# Patient Record
Sex: FEMALE | Race: OTHER | Employment: FULL TIME | URBAN - METROPOLITAN AREA
[De-identification: names, ages, dates, MRNs, and addresses within clinical notes are randomized per-mention and may not be internally consistent; named-entity substitution may affect disease eponyms.]

---

## 2018-11-27 ENCOUNTER — HOSPITAL ENCOUNTER (EMERGENCY)
Facility: HOSPITAL | Age: 48
Discharge: STILL ADMITTED OR ADMINISTRATIVE | End: 2018-11-27
Attending: EMERGENCY MEDICINE

## 2025-02-07 ENCOUNTER — TELEPHONE (OUTPATIENT)
Dept: ORTHOPEDICS CLINIC | Facility: CLINIC | Age: 55
End: 2025-02-07

## 2025-02-07 DIAGNOSIS — M25.531 RIGHT WRIST PAIN: Primary | ICD-10-CM

## 2025-02-07 NOTE — TELEPHONE ENCOUNTER
Patient have a fractured right wrist. Please advise on available appointment time.

## 2025-02-07 NOTE — TELEPHONE ENCOUNTER
X-Ray ordered and scheduled. Son/patient notified.    DOI: 2/6/25  Fall  Right wrist  ER as [facility] in [place]  - triquetral bone   Non displaced fracture along the radial styloid - re x ray in 7 to 10 days to assess for interval healing  Future Appointments   Date Time Provider Department Center   2/12/2025 11:40 AM Mitzy Owens PA [facility] [place]   6/4/2025 10:15 AM [facility] sent to the patient.

## 2025-02-07 NOTE — TELEPHONE ENCOUNTER
Where was she seen for fracture?  Does she have imaging?  Can she read from the report what it says about the fracture?

## 2025-02-10 NOTE — TELEPHONE ENCOUNTER
Mitzy Owens PA to [facility]       2/10/25  8:52 AM   Fine to stay on with new xr out of splint

## 2025-02-10 NOTE — TELEPHONE ENCOUNTER
Attempted to call patient, no VM no mychart set up.      Future Appointments   Date Time Provider Department Center   2/12/2025 11:15 AM [facility] XR IC [facility]1 [facility] [place]   2/12/2025 11:40 AM Mtizy Owens PA [facility] [place]

## 2025-02-12 ENCOUNTER — HOSPITAL ENCOUNTER (OUTPATIENT)
Dept: GENERAL RADIOLOGY | Age: 55
Discharge: HOME | End: 2025-02-12
Attending: STUDENT IN AN ORGANIZED HEALTH CARE EDUCATION/TRAINING PROGRAM
Payer: MEDICAID

## 2025-02-12 ENCOUNTER — OFFICE VISIT (OUTPATIENT)
Dept: ORTHOPEDICS CLINIC | Facility: CLINIC | Age: 55
End: 2025-02-12
Payer: MEDICAID

## 2025-02-12 VITALS — HEIGHT: 65 IN | BODY MASS INDEX: 26.33 KG/M2 | WEIGHT: 158 LBS

## 2025-02-12 DIAGNOSIS — S62.111A CLOSED CHIP FRACTURE OF TRIQUETRUM OF RIGHT WRIST, INITIAL ENCOUNTER: Primary | ICD-10-CM

## 2025-02-12 DIAGNOSIS — M25.531 RIGHT WRIST PAIN: ICD-10-CM

## 2025-02-12 PROCEDURE — 99203 OFFICE O/P NEW LOW 30 MIN: CPT | Performed by: PHYSICIAN ASSISTANT

## 2025-02-12 PROCEDURE — 73110 X-RAY EXAM OF WRIST: CPT | Performed by: STUDENT IN AN ORGANIZED HEALTH CARE EDUCATION/TRAINING PROGRAM

## 2025-02-12 RX ORDER — IBUPROFEN 800 MG/1
TABLET, FILM COATED ORAL
COMMUNITY

## 2025-02-12 RX ORDER — HYDROCODONE BITARTRATE AND ACETAMINOPHEN 5; 325 MG/1; MG/1
1 TABLET ORAL
COMMUNITY
Start: 2025-02-08

## 2025-02-12 NOTE — H&P
Clinic Note [facility]     Assessment/Plan:  54 year old female    Right wrist triquetral avulsion fracture, nondisplaced-discussed treating this non operatively and transitioning the patient to a removable wrist brace.  I advised the patient of no heavy lifting or weightbearing.  Patient can continue using the fingers for light activities.      ICD-10-CM    1. Closed chip fracture of triquetrum of right wrist, initial encounter  S62.111A DME - EXTERNAL              Follow Up: 3 to 4 weeks, x-rays before    Diagnostic Studies:  X-rays left wrist 3 views: These revealed evidence of a triquetral avulsion fracture    Physical Exam:    Ht 5' 5\" (1.651 m)   Wt 158 lb (71.7 kg)   BMI 26.29 kg/m²     Constitutional: NAD. AOx3. Well-developed and Well-nourished.   Psychiatric: Normal mood/ affect/ behavior. Judgment and thought content normal.     Left upper Extremity:   Inspection: Expected ecchymosis and swelling   Palpation:  Tender to palpation over the dorsal ulnar wrist at the dorsal triquetrum.  Nontender over Radhika's tubercle.  Nontender over the wrist and hand and wrist.   Motion: Elbow: normal bilateral symmetric ext/flex  Wrist: normal bilateral symmetric ext/flex/sup/pro  Finger: full composite fist       CC: Wrist Injury (RT WRIST FX; FELL ON ICE; DOI: 2/6/25)        HPI: This 54 year old female presents with complaints of pain to the right wrist.  Patient states that on 2/6/2025 she slipped and fell on black ice and landed on her right wrist.. Patient had pain and swelling since then.  She went to the emergency room and they told her to follow-up with orthopedics.          Past Medical History:    Anemia    Varicose veins     Past Surgical History:   Procedure Laterality Date    Hysterectomy      menorrhagia/ fibroids     Current Outpatient Medications   Medication Sig Dispense Refill    HYDROcodone-acetaminophen 5-325 MG Oral Tab Take 1 tablet by mouth.      Escitalopram Oxalate (LEXAPRO) 10 MG Oral  Tab Take 1 tablet by mouth daily. 30 tablet 5    SUMAtriptan Succinate (IMITREX) 50 MG Oral Tab Take 1 tablet by mouth every 2 (two) hours as needed for Migraine. 9 tablet 1    ibuprofen 800 MG Oral Tab TAKE 1 TABLET BY MOUTH EVERY 8 HOURS WITH FOOD OR MILK AS NEEDED (Patient not taking: Reported on 2/12/2025)      Elastic Bandages & Supports (JOBST ULTRASHEER 20-30MMHG) Does not apply Misc Use daily (Patient not taking: Reported on 2/12/2025) 2 each 0     Allergies[1]  Family History   Problem Relation Age of Onset    Diabetes Mother     Other (Other[other]) Sister         migraines     Social History     Occupational History    Not on file   Tobacco Use    Smoking status: Never    Smokeless tobacco: Never   Substance and Sexual Activity    Alcohol use: No     Alcohol/week: 0.0 standard drinks of alcohol    Drug use: No    Sexual activity: Not on file          Review of Systems (negative unless bolded):  General: fevers, chills, fatigue  CV:  chest pain, palpitations, leg swelling  Msk: bodyaches, neck pain, neck stiffness  Skin: rashes, open wounds, nonhealing ulcers  Hem: bleeds easily, bruise easily, immunocompromised  Neuro: dizziness, light headedness, headaches  Psych: anxious, depressed, anger issues    Mitzy Owens PA-C  Hand, Wrist, & Elbow Surgery  Physician Assistant to Dr. Anand [email or web address]  t: [phone]  f: [phone]         [1] No Known Allergies

## 2025-06-04 ENCOUNTER — OFFICE VISIT (OUTPATIENT)
Dept: RHEUMATOLOGY | Facility: CLINIC | Age: 55
End: 2025-06-04
Payer: MEDICAID

## 2025-06-04 VITALS
SYSTOLIC BLOOD PRESSURE: 130 MMHG | HEART RATE: 76 BPM | OXYGEN SATURATION: 95 % | DIASTOLIC BLOOD PRESSURE: 62 MMHG | WEIGHT: 156 LBS | TEMPERATURE: 97 F | BODY MASS INDEX: 25.99 KG/M2 | HEIGHT: 65 IN | RESPIRATION RATE: 16 BRPM

## 2025-06-04 DIAGNOSIS — K12.1 ORAL ULCER: ICD-10-CM

## 2025-06-04 DIAGNOSIS — L43.9 LICHEN PLANUS: ICD-10-CM

## 2025-06-04 DIAGNOSIS — K12.1 MOUTH ULCER: ICD-10-CM

## 2025-06-04 DIAGNOSIS — R76.8 RHEUMATOID FACTOR POSITIVE: Primary | ICD-10-CM

## 2025-06-04 DIAGNOSIS — M35.9 UNDIFFERENTIATED CONNECTIVE TISSUE DISEASE (HCC): ICD-10-CM

## 2025-06-04 DIAGNOSIS — R76.8 POSITIVE ANA (ANTINUCLEAR ANTIBODY): ICD-10-CM

## 2025-06-04 DIAGNOSIS — Z11.59 NEED FOR HEPATITIS C SCREENING TEST: ICD-10-CM

## 2025-06-04 DIAGNOSIS — R53.83 OTHER FATIGUE: ICD-10-CM

## 2025-06-04 DIAGNOSIS — Z11.59 NEED FOR HEPATITIS B SCREENING TEST: ICD-10-CM

## 2025-06-04 DIAGNOSIS — G25.81 RESTLESS LEG SYNDROME: ICD-10-CM

## 2025-06-04 DIAGNOSIS — Z51.81 THERAPEUTIC DRUG MONITORING: ICD-10-CM

## 2025-06-04 DIAGNOSIS — Z11.1 SCREENING FOR TUBERCULOSIS: ICD-10-CM

## 2025-06-04 DIAGNOSIS — G47.00 INSOMNIA, UNSPECIFIED TYPE: ICD-10-CM

## 2025-06-04 PROCEDURE — 99204 OFFICE O/P NEW MOD 45 MIN: CPT | Performed by: INTERNAL MEDICINE

## 2025-06-04 RX ORDER — TRAZODONE HYDROCHLORIDE 50 MG/1
50 TABLET ORAL NIGHTLY
Qty: 90 TABLET | Refills: 1 | Status: SHIPPED | OUTPATIENT
Start: 2025-06-04

## 2025-06-04 RX ORDER — HYDROXYCHLOROQUINE SULFATE 200 MG/1
200 TABLET, FILM COATED ORAL 2 TIMES DAILY
Qty: 180 TABLET | Refills: 1 | Status: SHIPPED | OUTPATIENT
Start: 2025-06-04

## 2025-06-04 NOTE — H&P
The following individual(s) verbally consented to be recorded using ambient AI listening technology and understand that they can each withdraw their consent to this listening technology at any point by asking the clinician to turn off or pause the recording:    Patient name: Viola Ayon  Additional names:

## 2025-06-04 NOTE — PATIENT INSTRUCTIONS
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 Jessika 1 tableta diariamente gunnar 2 semanas, y si toleras, aumenta a 1 tableta dos veces al día.Jessika trazodona 50 mg antes de dormir para el sueño.     ==============================================================================================================    Get blood work.     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 Take 1 tab daily for 2 weeks, and if tolerating, then increase to 1 tab twice daily.    Take trazodone 50 mg at bedtime for sleep.

## 2025-06-04 NOTE — PROGRESS NOTES
Rheumatology New Patient Note  =====================================================================================================  Date of visit: 6/4/2025  ?  Chief complaint: +RF  Chief Complaint   Patient presents with    New Patient     Abnormal labs, fatigue, leg weakness, bilateral ankle swelling, mouth sores, dry mouth , and headaches.   Rapid 3 score is 4.3     Referring   PCP  Markell Giraldo MD  Fax: [phone]  Phone: [phone]  =====================================================================================================  HPI    Viola Ayon is a 55 year old female     Use video : [id number] Jordan Ayon is a 55-year-old female who presents with evaluation of positive JOHNATHAN, low titer RF along with fatigue, leg stiffness, and oral ulcers.    She has experienced significant fatigue and stiffness in her legs and hands for the past year. Her legs feel 'rigid,' and her hands are also affected. She has not been taking any medications for these symptoms, including ibuprofen for pain management.    Oral ulcers have been present for the past 2-3 years, recurring frequently.  A previous diagnosis of lichen planus was made based on buccal mucosa biopsy by ENT.  Reportedly she tried topical corticosteroids with marginal effect.  She has never been on any systemic therapy for this.    Hair loss has been occurring for the past five months, primarily affecting the top of her head. She has noticed spots on her body that have resolved, leaving marks. Additionally, her fingers turn white in the cold, and she generally feels cold, indicating possible Raynaud's phenomenon.  Raynaud's has been present for about a year. Raynaud's: episodes for 20 min until she rewarms. Occurring for 1 year  -takes ibuprofen 800 mg prn for pain.     She reports difficulty sleeping for the past several months, sleeping about five hours a night and waking up frequently. She has not tried any medications for  sleep. No family history of lupus or blood clotting disorders. She has two children and reports no issues with delivery or miscarriages.  -Fatigue is very bad    2 children. No obstetric issues    Denies current malar rash, photosensitivity rash, discoid lesions, pleuritic chest pain, arthritis, seizures/psychosis, dry eyes/mouth, or blood clots.    Denies miscarriages or obstetric events (early pre-eclampsia, IUGR, placental insufficiency)  -Prior pregnancies and/or children:  --Pregnancy complications    14 point ROS negative except noted above    Medications:  Current Medications[1]    Past Medical History:  Past Medical History[2]  Past Surgical History:  Past Surgical History[3]  Family History:  Family History[4]  Social History:  Short Social Hx on File[5]  ?  Allergies:  Allergies[6]      Objective    Vitals:    06/04/25 1004   BP: 130/62   Pulse: 76   Resp: 16   Temp: 97.3 °F (36.3 °C)   SpO2: 95%   Weight: 156 lb (70.8 kg)   Height: 5' 5\" (1.651 m)       GEN: NAD, well-nourished.   HEENT: Head: NCAT. Face: No lesions. Eyes: Conjunctiva clear. Sclera are anicteric. PERRLA. EOMs are full. Ears: The right and left ear canals are clear.  Nose: No external or internal nasal deformities. Nasal septum is midline. Mouth: The lips are within normal limits.  Tongue is midline with no lesions. The oral cavity is clear.  - Buccal mucosa lesions, right greater than left  Neck: Supple. No neck masses. No thyromegaly. No LAD, parotid or submandicular gland palpated.   CV: RRR, no mrg, S1/S2  PULM: CTAB, no wrr, easy effort  Extremities: No cyanosis, edema or deformities.   Neurologic: Strength, CN2-12 grossly intact   Psych: normal affect.   Skin: Mild superior hair thinning  MSK: 28 joint count performed. No evidence of synovitis in mcp, pip, dip, wrist, elbows, shoulders, hips, knees, ankles, mtp unless otherwise noted. Full ROM of elbows, wrists, knees.    Hands- No ulceration/lesions noted. No swelling in IPJs, no  TTP or synovitis noted in joints of hand   Wrists- No pain with wrist flexion and extension. No swelling, erythema, or increased warmth.   Elbows- No swelling, erythema, or increased warmth.   Shoulders- FROM, abduction ~180 degrees bilaterally.    Knees- No swelling, erythema, or increased warmth. AROM flexion/extension ~0-180 degrees.    No valgus/varus laxities appreciated.   Ankles/Feet- No swelling, erythema, or increased warmth.    Labs:    August 12, 2022 right buccal biopsy  - Direct immunofluorescence  - Thick linear and shaggy fibrinogen deposits along the patchy glandular IgM, C3 along the basement membrane zone.  There is no IgG/IgA or CD5b-9 deposition in the specimen.  These aminal fluorescence findings would be consistent with lichen planus in the right clinical and histologic setting.  Other lichenoid mucositis or mucosal lichenoid tissue reaction could be present with similar findings.  - There is no immunofluorescence evidence of autoimmune mucosal blistering disease.    4/2024  JOHNATHAN 1: 160 homogenous  RF 15.7    No results found for: \"WBC\", \"RBC\", \"HGB\", \"HCT\", \"PLT\", \"MPV\", \"MCV\", \"MCH\", \"MCHC\", \"RDW\", \"NEPRELIM\", \"NEUTABS\", \"LYMPHABS\", \"EOSABS\", \"BASABS\", \"NEUT\", \"LYMPH\", \"MON\", \"EOS\", \"BASO\", \"NEPERCENT\", \"LYPERCENT\", \"MOPERCENT\", \"EOPERCENT\", \"BAPERCENT\", \"NE\", \"LYMABS\", \"MOABSO\", \"EOABSO\", \"BAABSO\"  No results found for: \"GLU\", \"BUN\", \"BUNCREA\", \"CREATSERUM\", \"ANIONGAP\", \"GFR\", \"GFRNAA\", \"GFRAA\", \"CA\", \"OSMOCALC\", \"ALKPHO\", \"AST\", \"ALT\", \"ALKPHOS\", \"BILT\", \"TP\", \"ALB\", \"GLOBULIN\", \"AGRATIO\", \"NA\", \"K\", \"CL\", \"CO2\"      No results found for: \"ANATI\", \"JOHNATHAN\", \"ANAS\", \"ANASCRN\", \"ANASCRNRFLX\", \"MARGARITA\"  No results found for: \"SSA\", \"SSAUR\", \"ANTISSA\", \"SSA52\", \"SSA60\", \"SSADD\", \"SSB\", \"ANTISSB\"  No results found for: \"DSDNA\", \"ANTIDSDNA\", \"SMUD\", \"ANTISM\", \"SM\", \"RNP\", \"ANTIRNP\", \"SMITHRNP\"  No results found for: \"SCL70\", \"SCL\", \"GQVTENS92\"  No results found for: \"C3\", \"C4\"  No results found for:  \"DRVVT\", \"LAINT\", \"PTTLUPUS\", \"LUPUSINTERP\", \"LA\", \"A4UW5AYWDS\", \"C4FZ7UAXTN\", \"L4LJMLQCTL\", \"S0EEERZNOC\"  No results found for: \"CARDIOLIPIGG\", \"CARDIOLIPIGM\", \"CARDIOLIPIGA\", \"CARDIOIGA\", \"CARLIP\"      Additional Labs:    Radiology:    Radiology review:      =====================================================================================================  Assessment and Plan  Assessment:  1. Rheumatoid factor positive    2. Positive JOHNATHAN (antinuclear antibody)    3. Need for hepatitis B screening test    4. Screening for tuberculosis    5. Need for hepatitis C screening test    6. Oral ulcer    7. Restless leg syndrome    8. Other fatigue    9. Mouth ulcer    10. Lichen planus    11. Therapeutic drug monitoring    12. Undifferentiated connective tissue disease (HCC)    13. Insomnia, unspecified type      #Positive JOHNATHAN, possible undifferentiated connective tissue disease  - Clinical manifestations: Oral ulcers (biopsies most concerning for lichen planus), Raynaud's since 2024, alopecia  - Serologies: Positive JOHNATHAN, low titer RF    #Lichen planus  Chronic oral ulcers confirmed as lichen planus. Previous treatments with topical corticosteroids provided temporary relief.   -Discussed with the patient that this may be a separate issue from what we suspect to be UCTD  -Quite painful      Plan:  Get blood work.  To evaluate for positive JOHNATHAN.  JOHNATHAN by extractable nuclear antigens, antiphospholipid antibodies, C3/C4, uric acid, repeat RF.  Obtain CCP antibody.  Given Raynaud's, obtain RNA plumbers 3  - Patient with restless leg symptom symptoms: B12/folate, iron studies, TSH, CK, A1c, SPEP, mag/Phos    Screening for hep B/C/HIV/LTBI/baseline pulmonary disease for high risk med use use and monitoring for toxicity  , eval chronic viral inflammatory arthrits: Hepatitis B: sAB, cAB IgM AND Total, HBsAg, Hepatitis C AB, IGRA    For lichen planus and ?undifferentiated connective tissue disease (UCTD):   Hydroxychloroquine  (plaquenil) start: Always take WITH food. Take 1 tab daily for 2 weeks, and if tolerating, then increase to 1 tab twice daily.    Take trazodone 50 mg at bedtime for sleep.    Plaquenil risks include (including and not limited to rash, Nausea, vomiting, neuromyopathy, and retinal toxicity (eye problems leading to vision loss). Patients should get an eye exam by opthalmology within the first year of taking the medication, after 5 years on medication, then annual eye exam to evaluate for plaquenil toxicity.       Here is a link to the [employer] fact-sheet about plaquenil:   https://www.rheumatology.org/Portals/0/Files/Hydroxychloroquine-Plaquenil-Fact-Sheet.pdf      Rtc 3-4 months     Diagnoses and all orders for this visit:    Rheumatoid factor positive  -     HCV Antibody; Future  -     Hep B Core AB, Tot; Future  -     Hepatitis B Surface Antibody; Future  -     Hepatitis B Surface Antigen; Future  -     Quantiferon TB Plus; Future  -     Cyclic Citrullinate Pep. IGG; Future  -     Rheumatoid Arthritis Factor; Future  -     Connective Tissue Disease (JOHNATHAN) Screen, Reflex Specific Antibody; Future  -     Uric Acid; Future  -     Complement C3, Serum; Future  -     Complement C4, Serum; Future  -     Anticardiolipin AB, IGM, Qn; Future  -     Anticardiolipin Ab, IGG, Qn; Future  -     Beta 2 Glycoprotein I AB, G/M; Future  -     Lupus Anticoagulant Comp; Future  -     RNA Polymerase III Antibody, IgG; Future  -     Vitamin D; Future  -     TSH W Reflex To Free T4; Future  -     Iron And Tibc; Future  -     Ferritin; Future  -     B12 AND FOLATE; Future  -     CK (Creatine Kinase) (Not Creatinine); Future  -     Hemoglobin A1C; Future  -     Monoclonal Protein Study; Future    Positive JOHNATHAN (antinuclear antibody)  -     HCV Antibody; Future  -     Hep B Core AB, Tot; Future  -     Hepatitis B Surface Antibody; Future  -     Hepatitis B Surface Antigen; Future  -     Quantiferon TB Plus; Future  -      Cyclic Citrullinate Pep. IGG; Future  -     Rheumatoid Arthritis Factor; Future  -     Connective Tissue Disease (JOHNATHAN) Screen, Reflex Specific Antibody; Future  -     Uric Acid; Future  -     Complement C3, Serum; Future  -     Complement C4, Serum; Future  -     Anticardiolipin AB, IGM, Qn; Future  -     Anticardiolipin Ab, IGG, Qn; Future  -     Beta 2 Glycoprotein I AB, G/M; Future  -     Lupus Anticoagulant Comp; Future  -     RNA Polymerase III Antibody, IgG; Future    Need for hepatitis B screening test  -     Hep B Core AB, Tot; Future  -     Hepatitis B Surface Antibody; Future  -     Hepatitis B Surface Antigen; Future    Screening for tuberculosis  -     Quantiferon TB Plus; Future    Need for hepatitis C screening test  -     HCV Antibody; Future    Oral ulcer  -     HCV Antibody; Future  -     Hep B Core AB, Tot; Future  -     Hepatitis B Surface Antibody; Future  -     Hepatitis B Surface Antigen; Future  -     Quantiferon TB Plus; Future  -     Cyclic Citrullinate Pep. IGG; Future  -     Rheumatoid Arthritis Factor; Future  -     Connective Tissue Disease (JOHNATHAN) Screen, Reflex Specific Antibody; Future  -     Uric Acid; Future  -     Complement C3, Serum; Future  -     Complement C4, Serum; Future  -     Anticardiolipin AB, IGM, Qn; Future  -     Anticardiolipin Ab, IGG, Qn; Future  -     Beta 2 Glycoprotein I AB, G/M; Future  -     Lupus Anticoagulant Comp; Future  -     RNA Polymerase III Antibody, IgG; Future    Restless leg syndrome  -     HCV Antibody; Future  -     Hep B Core AB, Tot; Future  -     Hepatitis B Surface Antibody; Future  -     Hepatitis B Surface Antigen; Future  -     Quantiferon TB Plus; Future  -     Cyclic Citrullinate Pep. IGG; Future  -     Rheumatoid Arthritis Factor; Future  -     Connective Tissue Disease (JOHNATHAN) Screen, Reflex Specific Antibody; Future  -     Uric Acid; Future  -     Complement C3, Serum; Future  -     Complement C4, Serum; Future  -     Anticardiolipin  AB, IGM, Qn; Future  -     Anticardiolipin Ab, IGG, Qn; Future  -     Beta 2 Glycoprotein I AB, G/M; Future  -     Lupus Anticoagulant Comp; Future  -     RNA Polymerase III Antibody, IgG; Future  -     Vitamin D; Future  -     TSH W Reflex To Free T4; Future  -     Iron And Tibc; Future  -     Ferritin; Future  -     B12 AND FOLATE; Future  -     CK (Creatine Kinase) (Not Creatinine); Future  -     Hemoglobin A1C; Future    Other fatigue  -     HCV Antibody; Future  -     Hep B Core AB, Tot; Future  -     Hepatitis B Surface Antibody; Future  -     Hepatitis B Surface Antigen; Future  -     Quantiferon TB Plus; Future  -     Cyclic Citrullinate Pep. IGG; Future  -     Rheumatoid Arthritis Factor; Future  -     Connective Tissue Disease (JOHNATHAN) Screen, Reflex Specific Antibody; Future  -     Uric Acid; Future  -     Complement C3, Serum; Future  -     Complement C4, Serum; Future  -     Anticardiolipin AB, IGM, Qn; Future  -     Anticardiolipin Ab, IGG, Qn; Future  -     Beta 2 Glycoprotein I AB, G/M; Future  -     Lupus Anticoagulant Comp; Future  -     RNA Polymerase III Antibody, IgG; Future  -     Vitamin D; Future  -     TSH W Reflex To Free T4; Future  -     Iron And Tibc; Future  -     Ferritin; Future  -     B12 AND FOLATE; Future  -     CK (Creatine Kinase) (Not Creatinine); Future  -     Hemoglobin A1C; Future    Mouth ulcer  -     HCV Antibody; Future  -     Hep B Core AB, Tot; Future  -     Hepatitis B Surface Antibody; Future  -     Hepatitis B Surface Antigen; Future  -     Quantiferon TB Plus; Future  -     Cyclic Citrullinate Pep. IGG; Future  -     Rheumatoid Arthritis Factor; Future  -     Connective Tissue Disease (JOHNATHAN) Screen, Reflex Specific Antibody; Future  -     Uric Acid; Future  -     Complement C3, Serum; Future  -     Complement C4, Serum; Future  -     Anticardiolipin AB, IGM, Qn; Future  -     Anticardiolipin Ab, IGG, Qn; Future  -     Beta 2 Glycoprotein I AB, G/M; Future  -     Lupus  Anticoagulant Comp; Future  -     RNA Polymerase III Antibody, IgG; Future  -     Vitamin D; Future  -     TSH W Reflex To Free T4; Future  -     Iron And Tibc; Future  -     Ferritin; Future  -     B12 AND FOLATE; Future  -     CK (Creatine Kinase) (Not Creatinine); Future  -     Hemoglobin A1C; Future    Lichen planus  -     Monoclonal Protein Study; Future  -     hydroxychloroquine 200 MG Oral Tab; Take 1 tablet (200 mg total) by mouth 2 (two) times daily.  -     G-6-Pd, Quant, Blood And Rbc; Future    Therapeutic drug monitoring  -     HCV Antibody; Future  -     Hep B Core AB, Tot; Future  -     Hepatitis B Surface Antibody; Future  -     Hepatitis B Surface Antigen; Future  -     Quantiferon TB Plus; Future  -     Cyclic Citrullinate Pep. IGG; Future  -     Rheumatoid Arthritis Factor; Future  -     Connective Tissue Disease (JOHNATHAN) Screen, Reflex Specific Antibody; Future  -     Uric Acid; Future  -     Complement C3, Serum; Future  -     Complement C4, Serum; Future  -     Anticardiolipin AB, IGM, Qn; Future  -     Anticardiolipin Ab, IGG, Qn; Future  -     Beta 2 Glycoprotein I AB, G/M; Future  -     Lupus Anticoagulant Comp; Future  -     RNA Polymerase III Antibody, IgG; Future  -     Vitamin D; Future  -     TSH W Reflex To Free T4; Future  -     Iron And Tibc; Future  -     Ferritin; Future  -     B12 AND FOLATE; Future  -     CK (Creatine Kinase) (Not Creatinine); Future  -     Hemoglobin A1C; Future  -     Monoclonal Protein Study; Future  -     hydroxychloroquine 200 MG Oral Tab; Take 1 tablet (200 mg total) by mouth 2 (two) times daily.  -     G-6-Pd, Quant, Blood And Rbc; Future    Undifferentiated connective tissue disease (HCC)  -     HCV Antibody; Future  -     Hep B Core AB, Tot; Future  -     Hepatitis B Surface Antibody; Future  -     Hepatitis B Surface Antigen; Future  -     Quantiferon TB Plus; Future  -     Cyclic Citrullinate Pep. IGG; Future  -     Rheumatoid Arthritis Factor; Future  -      Connective Tissue Disease (JOHNATHAN) Screen, Reflex Specific Antibody; Future  -     Uric Acid; Future  -     Complement C3, Serum; Future  -     Complement C4, Serum; Future  -     Anticardiolipin AB, IGM, Qn; Future  -     Anticardiolipin Ab, IGG, Qn; Future  -     Beta 2 Glycoprotein I AB, G/M; Future  -     Lupus Anticoagulant Comp; Future  -     RNA Polymerase III Antibody, IgG; Future  -     Vitamin D; Future  -     TSH W Reflex To Free T4; Future  -     Iron And Tibc; Future  -     Ferritin; Future  -     B12 AND FOLATE; Future  -     CK (Creatine Kinase) (Not Creatinine); Future  -     Hemoglobin A1C; Future  -     Monoclonal Protein Study; Future  -     hydroxychloroquine 200 MG Oral Tab; Take 1 tablet (200 mg total) by mouth 2 (two) times daily.  -     G-6-Pd, Quant, Blood And Rbc; Future    Insomnia, unspecified type  -     traZODone 50 MG Oral Tab; Take 1 tablet (50 mg total) by mouth nightly.        Return in about 15 weeks (around 9/17/2025).      The above plan of care, diagnosis, orders, and follow-up were discussed with the patient. Questions related to this recommended plan of care were answered.    Thank you for referring this delightful patient to me. Please feel free to contact me with any questions.     This report was performed utilizing speech recognition software technology. Despite proofreading, speech recognition errors could escape detection. If a word or phrase is confusing or out of context, please do not hesitate to call for   clarification.       Kind regards      Gabriel Seth MD  [facility]         [1]    hydroxychloroquine 200 MG Oral Tab Take 1 tablet (200 mg total) by mouth 2 (two) times daily. 180 tablet 1    traZODone 50 MG Oral Tab Take 1 tablet (50 mg total) by mouth nightly. 90 tablet 1   [2]   Past Medical History:   Anemia    Varicose veins   [3]   Past Surgical History:  Procedure Laterality Date    Hc stab phlebectomy varicose vein 1 extrem 10 - 20 incis       Hysterectomy      menorrhagia/ fibroids   [4]   Family History  Problem Relation Age of Onset    Diabetes Mother     Other (Other[other]) Sister         migraines   [5]   Social History  Socioeconomic History    Marital status: Unknown   Tobacco Use    Smoking status: Never    Smokeless tobacco: Never   Substance and Sexual Activity    Alcohol use: No     Alcohol/week: 0.0 standard drinks of alcohol    Drug use: No     Social Drivers of Health     Food Insecurity: Not at Risk (2025)    Received from [employer]    Food Insecurity     Food: 1   Transportation Needs: Not at Risk (2025)    Received from [employer]    Transportation Needs     Transportation: 1   Stress: Not on File (10/7/2022)    Received from [facility]    Stress     Stress: 0   Housing Stability: Not at Risk (2025)    Received from [employer]    Housing Stability     Housin   [6]   Allergies  Allergen Reactions    Gabapentin RASH     gabapentin

## 2025-06-21 ENCOUNTER — LAB ENCOUNTER (OUTPATIENT)
Dept: LAB | Age: 55
End: 2025-06-21
Attending: INTERNAL MEDICINE
Payer: MEDICAID

## 2025-06-21 DIAGNOSIS — K12.1 MOUTH ULCER: ICD-10-CM

## 2025-06-21 DIAGNOSIS — L43.9 LICHEN PLANUS: ICD-10-CM

## 2025-06-21 DIAGNOSIS — M35.9 UNDIFFERENTIATED CONNECTIVE TISSUE DISEASE (HCC): ICD-10-CM

## 2025-06-21 DIAGNOSIS — Z11.59 NEED FOR HEPATITIS B SCREENING TEST: ICD-10-CM

## 2025-06-21 DIAGNOSIS — K12.1 ORAL ULCER: ICD-10-CM

## 2025-06-21 DIAGNOSIS — R53.83 OTHER FATIGUE: ICD-10-CM

## 2025-06-21 DIAGNOSIS — G25.81 RESTLESS LEG SYNDROME: ICD-10-CM

## 2025-06-21 DIAGNOSIS — R76.8 POSITIVE ANA (ANTINUCLEAR ANTIBODY): ICD-10-CM

## 2025-06-21 DIAGNOSIS — R76.8 RHEUMATOID FACTOR POSITIVE: ICD-10-CM

## 2025-06-21 DIAGNOSIS — Z11.1 SCREENING FOR TUBERCULOSIS: ICD-10-CM

## 2025-06-21 DIAGNOSIS — Z11.59 NEED FOR HEPATITIS C SCREENING TEST: ICD-10-CM

## 2025-06-21 DIAGNOSIS — Z51.81 THERAPEUTIC DRUG MONITORING: ICD-10-CM

## 2025-06-21 LAB
C3 SERPL-MCNC: 144.3 MG/DL (ref 90–170)
C4 SERPL-MCNC: 43.8 MG/DL (ref 12–36)
CK SERPL-CCNC: 48 U/L (ref 34–145)
DEPRECATED HBV CORE AB SER IA-ACNC: 23 NG/ML (ref 50–306)
EST. AVERAGE GLUCOSE BLD GHB EST-MCNC: 114 MG/DL (ref 68–126)
FOLATE SERPL-MCNC: 17.7 NG/ML (ref 5.4–?)
HBA1C MFR BLD: 5.6 % (ref ?–5.7)
HBV CORE AB SERPL QL IA: NONREACTIVE
HBV SURFACE AB SER QL: NONREACTIVE
HBV SURFACE AB SERPL IA-ACNC: 9.94 MIU/ML
HBV SURFACE AG SER-ACNC: <0.1 [IU]/L
HBV SURFACE AG SERPL QL IA: NONREACTIVE
HCV AB SERPL QL IA: NONREACTIVE
IRON SATN MFR SERPL: 16 % (ref 15–50)
IRON SERPL-MCNC: 58 UG/DL (ref 50–170)
RHEUMATOID FACT SERPL-ACNC: 8.6 IU/ML (ref ?–14)
TOTAL IRON BINDING CAPACITY: 356 UG/DL (ref 250–425)
TRANSFERRIN SERPL-MCNC: 281 MG/DL (ref 250–380)
TSI SER-ACNC: 1.41 UIU/ML (ref 0.55–4.78)
URATE SERPL-MCNC: 4 MG/DL (ref 3.1–7.8)
VIT B12 SERPL-MCNC: 226 PG/ML (ref 211–911)
VIT D+METAB SERPL-MCNC: 25.5 NG/ML (ref 30–100)

## 2025-06-21 PROCEDURE — 86146 BETA-2 GLYCOPROTEIN ANTIBODY: CPT

## 2025-06-21 PROCEDURE — 83540 ASSAY OF IRON: CPT

## 2025-06-21 PROCEDURE — 86334 IMMUNOFIX E-PHORESIS SERUM: CPT

## 2025-06-21 PROCEDURE — 82728 ASSAY OF FERRITIN: CPT

## 2025-06-21 PROCEDURE — 86038 ANTINUCLEAR ANTIBODIES: CPT

## 2025-06-21 PROCEDURE — 36415 COLL VENOUS BLD VENIPUNCTURE: CPT

## 2025-06-21 PROCEDURE — 86200 CCP ANTIBODY: CPT

## 2025-06-21 PROCEDURE — 86803 HEPATITIS C AB TEST: CPT

## 2025-06-21 PROCEDURE — 86160 COMPLEMENT ANTIGEN: CPT

## 2025-06-21 PROCEDURE — 84165 PROTEIN E-PHORESIS SERUM: CPT

## 2025-06-21 PROCEDURE — 86147 CARDIOLIPIN ANTIBODY EA IG: CPT

## 2025-06-21 PROCEDURE — 86225 DNA ANTIBODY NATIVE: CPT

## 2025-06-21 PROCEDURE — 85610 PROTHROMBIN TIME: CPT

## 2025-06-21 PROCEDURE — 83550 IRON BINDING TEST: CPT

## 2025-06-21 PROCEDURE — 82550 ASSAY OF CK (CPK): CPT

## 2025-06-21 PROCEDURE — 86704 HEP B CORE ANTIBODY TOTAL: CPT

## 2025-06-21 PROCEDURE — 85732 THROMBOPLASTIN TIME PARTIAL: CPT

## 2025-06-21 PROCEDURE — 82607 VITAMIN B-12: CPT

## 2025-06-21 PROCEDURE — 86431 RHEUMATOID FACTOR QUANT: CPT

## 2025-06-21 PROCEDURE — 84443 ASSAY THYROID STIM HORMONE: CPT

## 2025-06-21 PROCEDURE — 86706 HEP B SURFACE ANTIBODY: CPT

## 2025-06-21 PROCEDURE — 85613 RUSSELL VIPER VENOM DILUTED: CPT

## 2025-06-21 PROCEDURE — 83036 HEMOGLOBIN GLYCOSYLATED A1C: CPT

## 2025-06-21 PROCEDURE — 82306 VITAMIN D 25 HYDROXY: CPT

## 2025-06-21 PROCEDURE — 87340 HEPATITIS B SURFACE AG IA: CPT

## 2025-06-21 PROCEDURE — 84550 ASSAY OF BLOOD/URIC ACID: CPT

## 2025-06-21 PROCEDURE — 82746 ASSAY OF FOLIC ACID SERUM: CPT

## 2025-06-21 PROCEDURE — 86480 TB TEST CELL IMMUN MEASURE: CPT

## 2025-06-21 PROCEDURE — 85705 THROMBOPLASTIN INHIBITION: CPT

## 2025-06-21 PROCEDURE — 83521 IG LIGHT CHAINS FREE EACH: CPT

## 2025-06-21 PROCEDURE — 83516 IMMUNOASSAY NONANTIBODY: CPT

## 2025-06-22 ENCOUNTER — TELEPHONE (OUTPATIENT)
Facility: CLINIC | Age: 55
End: 2025-06-22

## 2025-06-22 DIAGNOSIS — R79.89 LOW VITAMIN D LEVEL: Primary | ICD-10-CM

## 2025-06-22 DIAGNOSIS — R79.0 LOW FERRITIN: ICD-10-CM

## 2025-06-22 RX ORDER — ERGOCALCIFEROL 1.25 MG/1
50000 CAPSULE, LIQUID FILLED ORAL WEEKLY
Qty: 12 CAPSULE | Refills: 0 | Status: SHIPPED | OUTPATIENT
Start: 2025-06-22 | End: 2025-09-14

## 2025-06-22 RX ORDER — FERROUS SULFATE 325(65) MG
325 TABLET ORAL
Qty: 90 TABLET | Refills: 3 | Status: SHIPPED | OUTPATIENT
Start: 2025-06-22

## 2025-06-23 ENCOUNTER — HOSPITAL ENCOUNTER (OUTPATIENT)
Dept: GENERAL RADIOLOGY | Age: 55
Discharge: HOME | End: 2025-06-23
Attending: PHYSICIAN ASSISTANT
Payer: MEDICAID

## 2025-06-23 DIAGNOSIS — S62.111A CLOSED CHIP FRACTURE OF TRIQUETRUM OF RIGHT WRIST, INITIAL ENCOUNTER: ICD-10-CM

## 2025-06-23 LAB
B2 GLYCOPROT1 IGG SERPL IA-ACNC: 1 U/ML (ref ?–7)
B2 GLYCOPROT1 IGM SERPL IA-ACNC: 3.6 U/ML (ref ?–7)
CARDIOLIPIN IGG SERPL-ACNC: 2.8 GPL (ref ?–10)
CARDIOLIPIN IGM SERPL-ACNC: 16 MPL (ref ?–10)
CCP IGG SERPL-ACNC: 1.2 U/ML (ref 0–6.9)
DSDNA IGG SERPL IA-ACNC: 2 IU/ML (ref ?–10)
ENA AB SER QL IA: 0.3 UG/L (ref ?–0.7)
ENA AB SER QL IA: NEGATIVE

## 2025-06-23 PROCEDURE — 73110 X-RAY EXAM OF WRIST: CPT | Performed by: PHYSICIAN ASSISTANT

## 2025-06-24 LAB
ALBUMIN SERPL ELPH-MCNC: 3.98 G/DL (ref 3.75–5.21)
ALBUMIN/GLOB SERPL: 1.31 {RATIO} (ref 1–2)
ALPHA1 GLOB SERPL ELPH-MCNC: 0.29 G/DL (ref 0.19–0.46)
ALPHA2 GLOB SERPL ELPH-MCNC: 0.72 G/DL (ref 0.48–1.05)
APTT PPP: 31.3 SECONDS (ref 23–36)
B-GLOBULIN SERPL ELPH-MCNC: 0.74 G/DL (ref 0.68–1.23)
GAMMA GLOB SERPL ELPH-MCNC: 1.28 G/DL (ref 0.62–1.7)
INR BLD: 0.98 (ref 0.85–1.16)
KAPPA LC FREE SER-MCNC: 1.28 MG/DL (ref 0.33–1.94)
KAPPA LC FREE/LAMBDA FREE SER NEPH: 0.92 {RATIO} (ref 0.26–1.65)
LA 3 SCREEN W REFLEX-IMP: NEGATIVE
LAMBDA LC FREE SERPL-MCNC: 1.39 MG/DL (ref 0.57–2.63)
M TB IFN-G CD4+ T-CELLS BLD-ACNC: 0.06 IU/ML
M TB TUBERC IFN-G BLD QL: NEGATIVE
M TB TUBERC IGNF/MITOGEN IGNF CONTROL: 7.25 IU/ML
PROT SERPL-MCNC: 7 G/DL (ref 5.7–8.2)
PROTHROMBIN TIME: 13.1 SECONDS (ref 11.6–14.8)
QFT TB1 AG MINUS NIL: 0.01 IU/ML
QFT TB2 AG MINUS NIL: 0.01 IU/ML
SCREEN DRVVT: 1.13 S (ref 0–1.29)
SCREEN DRVVT: NEGATIVE S
STACLOT LA DELTA: 3.4 SECONDS (ref ?–8)

## 2025-06-27 LAB — ANTI-RNA POLYMERASE III: <20 UNITS
